# Patient Record
Sex: FEMALE | Race: WHITE | NOT HISPANIC OR LATINO
[De-identification: names, ages, dates, MRNs, and addresses within clinical notes are randomized per-mention and may not be internally consistent; named-entity substitution may affect disease eponyms.]

---

## 2023-04-28 PROBLEM — Z00.00 ENCOUNTER FOR PREVENTIVE HEALTH EXAMINATION: Status: ACTIVE | Noted: 2023-04-28

## 2023-06-06 ENCOUNTER — APPOINTMENT (OUTPATIENT)
Dept: ORTHOPEDIC SURGERY | Facility: CLINIC | Age: 63
End: 2023-06-06
Payer: COMMERCIAL

## 2023-06-06 VITALS — HEIGHT: 62 IN | BODY MASS INDEX: 26.68 KG/M2 | WEIGHT: 145 LBS

## 2023-06-06 DIAGNOSIS — Z78.9 OTHER SPECIFIED HEALTH STATUS: ICD-10-CM

## 2023-06-06 DIAGNOSIS — M25.561 PAIN IN RIGHT KNEE: ICD-10-CM

## 2023-06-06 PROCEDURE — 73564 X-RAY EXAM KNEE 4 OR MORE: CPT | Mod: RT

## 2023-06-06 PROCEDURE — 99204 OFFICE O/P NEW MOD 45 MIN: CPT

## 2023-06-06 NOTE — PHYSICAL EXAM
[NL (0)] : extension 0 degrees [5___] : hamstring 5[unfilled]/5 [Negative] : negative August[place]'s [Right] : right knee [AP] : anteroposterior [Lateral] : lateral [[place]] : skyline [AP Standing] : anteroposterior standing [Mild tricompartmental OA medial narrowing] : Mild tricompartmental OA medial narrowing [Mild patellofemoral OA] : Mild patellofemoral OA [] : no extensor lag [de-identified] : Heel Tender when walking [FreeTextEntry9] : Patella lexy [TWNoteComboBox7] : flexion 130 degrees

## 2023-06-06 NOTE — DISCUSSION/SUMMARY
[de-identified] :  Lengthy discussion regarding options was had with the patient. Nonsurgical options including but not limited to cortisone,viscosupplementation, anti-inflammatory medications, activity modification,  non impact exercise /maintaining a healthy BMI, bracing, and icing were reviewed. Surgical options including but not limited to arthroscopy, and joint replacement were discussed as was risks, benefits and alternatives. All questions were answered. \par \par Discussed importance of non-impact exercise and muscle stretching before and after exercise. Reviewed x-rays  Explained the importance of ice and rest.    Stretching exercises shown,  Explained the importance of Range of Motion before strength. \par \par Rec patient f/u with Dr. Lira to discuss sxs of the plantar surface of the foot. In the interim the patient was advised to stretch and massage the foot. \par \par Patient hjas a preservative allergy, reports previous 4 day motrin regiment provided no relief. \par \par f/u prn\par

## 2023-06-06 NOTE — HISTORY OF PRESENT ILLNESS
[de-identified] : Date of Injury/Onset:     4 months \par Pain: At Rest: 3 /10   \par With Activity: 5 /10 \par Affecting Sleep:N\par Difficulty with stairs:Y\par Difficulty getting in and out of car:Y\par Sit to stand stiffness:Y\par Mechanism of injury:  NKI- PATIENT STATES SHE IS HAVING PLANTAR FASCIITIS ISSUES\par This  is not a Work Related Injury being treated under Worker's Compensation.\par This is not   an athletic injury occurring associated with an interscholastic or organized sports team.\par Quality of symptoms: C/O LATERAL SIDED PAIN, NO SWELLING, CLICKING\par Improves with:    REST\par Worse with:   SIT TO STAND \par Previous Treatment/Imaging/Studies Since Last Visit: MOTRIN\par Reports Available For Review Today: NONE\par \par \par  \par \par

## 2023-06-07 ENCOUNTER — TRANSCRIPTION ENCOUNTER (OUTPATIENT)
Age: 63
End: 2023-06-07

## 2023-06-22 ENCOUNTER — APPOINTMENT (OUTPATIENT)
Dept: ORTHOPEDIC SURGERY | Facility: CLINIC | Age: 63
End: 2023-06-22
Payer: COMMERCIAL

## 2023-06-22 PROCEDURE — 99214 OFFICE O/P EST MOD 30 MIN: CPT

## 2023-06-22 PROCEDURE — 73630 X-RAY EXAM OF FOOT: CPT | Mod: RT

## 2023-06-22 NOTE — ASSESSMENT
[FreeTextEntry1] : 63 yo female presenting with right plantar fasciitis. X-rays negative. Patient has been performing non-surgical management.\par -Discussed with patient that right plantar fascial CSI not recommend due to increased risk of calcaneal stress fractures due to thinning of calcaneal fat pad. Patient understands.\par -Rx plantar fasciitis night time splint given\par -Demonstrated plantar fascia stretching/strengthening exercises with patient\par -Rx PT given today\par -Discussed risks and benefits of plantar fascial PRP injections. Discussed with patient that if conservative management fails that patient may be indicated for PRP.\par -F/u 6 weeks\par \par Entered by Lizandro Shah PA-C acting as scribe.\par Dr. Lira Attestation\par The documentation recorded by the scribe, in my presence, accurately reflects the service I, Dr. Lira, personally performed, and the decisions made by me with my edits as appropriate.

## 2023-06-22 NOTE — PHYSICAL EXAM
[de-identified] : Examination of the right foot and ankle is as follows:\par INSPECTION: no abrasion, no laceration, no swelling, no erythema, no ecchymosis, no gross deformity\par PALPATION: plantar fascia tenderness, plantar heels tenderness\par ROM: dorsiflexion 20 degrees, plantar flexion 40 degrees, inversion 30 degrees, eversion 20 degrees\par STRENGTH: dorsiflexion 5/5. plantarflexion 5/5, inversion 5/5, eversion 5/5, EHL 5/5, FHL 5/5\par VASCULAR: dorsalis pedis pulse: 2+, posterior tibialis pulse 2+\par NEURO: sensation present to light touch in all distributions\par GAIT: mildly antalgic, RLE short cam boot, patient ambulates without asssitive devices\par \par X-rays of the right calcaneus is as follows:\par Calcaneus 2 View: There are no fractures, subluxations or dislocations. No significant abnormalities are seen.

## 2023-06-22 NOTE — DATA REVIEWED
[MRI] : MRI [Right] : of the right [FreeTextEntry1] : Alexandra; IMPRESSION:\par \par \par Moderate scarring/thickening at the origin of the plantar fascia, without\par discrete tears. Adjacent soft tissue edema. Small plantar calcaneal enthesophyte\par with associated mild bone marrow edema.\par \par Longitudinal split tear of the peroneus brevis tendon with distal\par reconstitution. Mild tibialis posterior tendinosis and tenosynovitis, without\par tears.\par \par Scarring of the calcaneal fibular and deltoid ligaments. [I independently reviewed and interpreted images and report] : I independently reviewed and interpreted images and report

## 2023-06-22 NOTE — HISTORY OF PRESENT ILLNESS
[Sudden] : sudden [7] : 7 [Dull/Aching] : dull/aching [Throbbing] : throbbing [Intermittent] : intermittent [Household chores] : household chores [Leisure] : leisure [Social interactions] : social interactions [Rest] : rest [Retired] : Work status: retired [de-identified] : Patient is here for her right foot. Patient states NKI. Patient states the pain started 1/2023. Patient states she has pain on the bottom of her foot as well as the medial lower ankle pain. Patient states she gets sharp shooting pain. Patient states she saw a podiatrist and had orthotics made and was put into a cam boot. Patient had MRI done at [facility] on 6/15/23\par \par IMPRESSION:\par \par \par Moderate scarring/thickening at the origin of the plantar fascia, without\par discrete tears. Adjacent soft tissue edema. Small plantar calcaneal enthesophyte\par with associated mild bone marrow edema.\par \par Longitudinal split tear of the peroneus brevis tendon with distal\par reconstitution. Mild tibialis posterior tendinosis and tenosynovitis, without\par tears.\par \par Scarring of the calcaneal fibular and deltoid ligaments. [] : Post Surgical Visit: no [FreeTextEntry1] : Right foot  [FreeTextEntry3] : 1/2023 [FreeTextEntry5] : Patient states NKI [de-identified] : Movement

## 2023-06-25 ENCOUNTER — FORM ENCOUNTER (OUTPATIENT)
Age: 63
End: 2023-06-25

## 2023-07-02 ENCOUNTER — FORM ENCOUNTER (OUTPATIENT)
Age: 63
End: 2023-07-02

## 2023-08-04 ENCOUNTER — APPOINTMENT (OUTPATIENT)
Dept: ORTHOPEDIC SURGERY | Facility: CLINIC | Age: 63
End: 2023-08-04
Payer: COMMERCIAL

## 2023-08-04 PROCEDURE — 99213 OFFICE O/P EST LOW 20 MIN: CPT

## 2023-08-04 NOTE — PHYSICAL EXAM
[de-identified] : Examination of the right foot and ankle is as follows:\par  INSPECTION: no abrasion, no laceration, no swelling, no erythema, no ecchymosis, no gross deformity\par  PALPATION: plantar fascia tenderness, plantar heels tenderness\par  ROM: dorsiflexion 20 degrees, plantar flexion 40 degrees, inversion 30 degrees, eversion 20 degrees\par  STRENGTH: dorsiflexion 5/5. plantarflexion 5/5, inversion 5/5, eversion 5/5, EHL 5/5, FHL 5/5\par  VASCULAR: dorsalis pedis pulse: 2+, posterior tibialis pulse 2+\par  NEURO: sensation present to light touch in all distributions\par  GAIT: mildly antalgic, RLE short cam boot, patient ambulates without asssitive devices\par  \par  X-rays of the right calcaneus is as follows:\par  Calcaneus 2 View: There are no fractures, subluxations or dislocations. No significant abnormalities are seen.

## 2023-08-04 NOTE — DATA REVIEWED
[MRI] : MRI [Right] : of the right [I independently reviewed and interpreted images and report] : I independently reviewed and interpreted images and report [FreeTextEntry1] : Alexandra; IMPRESSION:\par  \par  \par  Moderate scarring/thickening at the origin of the plantar fascia, without\par  discrete tears. Adjacent soft tissue edema. Small plantar calcaneal enthesophyte\par  with associated mild bone marrow edema.\par  \par  Longitudinal split tear of the peroneus brevis tendon with distal\par  reconstitution. Mild tibialis posterior tendinosis and tenosynovitis, without\par  tears.\par  \par  Scarring of the calcaneal fibular and deltoid ligaments.

## 2023-08-04 NOTE — HISTORY OF PRESENT ILLNESS
[de-identified] : Patient is here for her right foot. Patient states NKI. Patient states the pain started 1/2023. Patient states she has pain on the bottom of her foot as well as the medial lower ankle pain. Patient states she gets sharp shooting pain. Patient has been going to PT at this time and reports minimal improvement in pain and functionality.   [Sudden] : sudden [7] : 7 [Dull/Aching] : dull/aching [Throbbing] : throbbing [Intermittent] : intermittent [Household chores] : household chores [Leisure] : leisure [Social interactions] : social interactions [Rest] : rest [Retired] : Work status: retired [] : Post Surgical Visit: no [FreeTextEntry1] : Right foot  [FreeTextEntry3] : 1/2023 [FreeTextEntry5] : Patient states NKI [de-identified] : Movement

## 2023-08-04 NOTE — ASSESSMENT
[FreeTextEntry1] : 63 yo female presenting with right plantar fasciitis. X-rays negative. Patient has been performing non-surgical management with minimal improvement. -Discussed with patient that right plantar fascial CSI not recommend due to increased risk of calcaneal stress fractures due to thinning of calcaneal fat pad. Patient understands. -Cont home exercise program since PT is not helping -Demonstrated plantar fascia stretching/strengthening exercises with patient -Discussed risks and benefits of plantar fascial PRP injections. Discussed with patient that if conservative management fails that patient may be indicated for PRP. -F/u prn

## 2023-09-15 ENCOUNTER — APPOINTMENT (OUTPATIENT)
Dept: ORTHOPEDIC SURGERY | Facility: CLINIC | Age: 63
End: 2023-09-15
Payer: COMMERCIAL

## 2023-09-15 DIAGNOSIS — M72.2 PLANTAR FASCIAL FIBROMATOSIS: ICD-10-CM

## 2023-09-15 PROCEDURE — 99213 OFFICE O/P EST LOW 20 MIN: CPT

## 2025-07-09 ENCOUNTER — APPOINTMENT (OUTPATIENT)
Dept: ORTHOPEDIC SURGERY | Facility: CLINIC | Age: 65
End: 2025-07-09